# Patient Record
Sex: FEMALE | Race: WHITE | NOT HISPANIC OR LATINO | Employment: UNEMPLOYED | RURAL
[De-identification: names, ages, dates, MRNs, and addresses within clinical notes are randomized per-mention and may not be internally consistent; named-entity substitution may affect disease eponyms.]

---

## 2024-08-14 ENCOUNTER — OFFICE VISIT (OUTPATIENT)
Dept: FAMILY MEDICINE CLINIC | Facility: CLINIC | Age: 49
End: 2024-08-14
Payer: COMMERCIAL

## 2024-08-14 VITALS
WEIGHT: 161.4 LBS | OXYGEN SATURATION: 98 % | SYSTOLIC BLOOD PRESSURE: 126 MMHG | HEART RATE: 69 BPM | DIASTOLIC BLOOD PRESSURE: 80 MMHG | BODY MASS INDEX: 24.46 KG/M2 | HEIGHT: 68 IN

## 2024-08-14 DIAGNOSIS — Z13.29 SCREENING FOR THYROID DISORDER: ICD-10-CM

## 2024-08-14 DIAGNOSIS — Z13.1 SCREENING FOR DIABETES MELLITUS (DM): ICD-10-CM

## 2024-08-14 DIAGNOSIS — Q61.02 MULTIPLE CONGENITAL CYSTS OF KIDNEY: ICD-10-CM

## 2024-08-14 DIAGNOSIS — Z13.21 ENCOUNTER FOR VITAMIN DEFICIENCY SCREENING: ICD-10-CM

## 2024-08-14 DIAGNOSIS — M85.80 OSTEOPENIA, UNSPECIFIED LOCATION: ICD-10-CM

## 2024-08-14 DIAGNOSIS — Z13.220 SCREENING FOR LIPID DISORDERS: ICD-10-CM

## 2024-08-14 DIAGNOSIS — Z12.31 SCREENING MAMMOGRAM FOR BREAST CANCER: ICD-10-CM

## 2024-08-14 DIAGNOSIS — E61.1 IRON DEFICIENCY: ICD-10-CM

## 2024-08-14 DIAGNOSIS — K51.90 ULCERATIVE COLITIS WITHOUT COMPLICATIONS, UNSPECIFIED LOCATION: ICD-10-CM

## 2024-08-14 DIAGNOSIS — Z00.00 ANNUAL PHYSICAL EXAM: Primary | ICD-10-CM

## 2024-08-14 PROBLEM — K50.10 CROHN'S DISEASE OF LARGE BOWEL: Status: RESOLVED | Noted: 2024-08-14 | Resolved: 2024-08-14

## 2024-08-14 PROBLEM — K50.10 CROHN'S DISEASE OF LARGE BOWEL: Status: ACTIVE | Noted: 2024-08-14

## 2024-08-14 PROBLEM — F41.9 ANXIETY: Status: ACTIVE | Noted: 2017-04-10

## 2024-08-14 PROCEDURE — 1159F MED LIST DOCD IN RCRD: CPT | Performed by: NURSE PRACTITIONER

## 2024-08-14 PROCEDURE — 1160F RVW MEDS BY RX/DR IN RCRD: CPT | Performed by: NURSE PRACTITIONER

## 2024-08-14 PROCEDURE — 2014F MENTAL STATUS ASSESS: CPT | Performed by: NURSE PRACTITIONER

## 2024-08-14 PROCEDURE — 99386 PREV VISIT NEW AGE 40-64: CPT | Performed by: NURSE PRACTITIONER

## 2024-08-14 RX ORDER — LISINOPRIL 5 MG/1
TABLET ORAL
COMMUNITY
Start: 2024-06-05

## 2024-08-14 RX ORDER — UPADACITINIB 30 MG/1
1 TABLET, EXTENDED RELEASE ORAL DAILY
COMMUNITY

## 2024-08-14 RX ORDER — ACETAMINOPHEN 160 MG
1 TABLET,DISINTEGRATING ORAL DAILY
COMMUNITY
Start: 2024-07-18

## 2024-08-14 RX ORDER — VEDOLIZUMAB 300 MG/5ML
INJECTION, POWDER, LYOPHILIZED, FOR SOLUTION INTRAVENOUS
COMMUNITY

## 2024-08-14 RX ORDER — MESALAMINE 0.38 G/1
CAPSULE, EXTENDED RELEASE ORAL
COMMUNITY
Start: 2024-08-08

## 2024-08-14 RX ORDER — ALPRAZOLAM 1 MG/1
TABLET ORAL
COMMUNITY
Start: 2024-05-08

## 2024-08-14 NOTE — PROGRESS NOTES
Annual Physical     Name: Jaida Cruz    : 1975     MRN: [id number]     Chief Complaint  Annual Exam (Pt is here to establish care and for a physical )    Subjective     History of Present Illness:  Jaida Cruz is a 48 y.o. female who presents today for a health maintenance evaluation.    She is overall doing well, transferring care here from a PCP in [facility].     She has history of polycystic kidney and has a nephrologist in [place], [place]. She has eGFR that is stable in the 40's and stable, but elevated, creatinine. She sees specialist every 3-6 months. She takes Lisinopril for kidney protection - she does not have history of HTN. Cannot tolerate higher dose than 5mg.     She has history of ulcerative colitis and has a GI specialist in [place], [place]. She has been on Renvoq and Entyvio infusions, both are managed by her specialist. Last colonoscopy was in Summer 2023.       Social History  Tobacco Use: Medium Risk (2024)    Patient History     Smoking Tobacco Use: Former     Smokeless Tobacco Use: Never     Passive Exposure: Not on file     Social History     Socioeconomic History    Marital status:    Tobacco Use    Smoking status: Former     Current packs/day: 0.00     Average packs/day: 1 pack/day for 13.0 years (13.0 ttl pk-yrs)     Types: Cigarettes     Start date:      Quit date:      Years since quittin.6    Smokeless tobacco: Never   Vaping Use    Vaping status: Never Used   Substance and Sexual Activity    Alcohol use: Yes     Alcohol/week: 3.0 standard drinks of alcohol     Types: 2 Glasses of wine, 1 Cans of beer per week    Drug use: Never    Sexual activity: Yes     Partners: Male     Birth control/protection: Tubal ligation       General History  Jaida  does have regular dental visits. Has an upcoming appointment.   She does not complain of vision problems. Last eye exam was a few years ago, has an upcoming appt.  Immunizations are  "up to date. The patient needs the following immunizations: none    Lifestyle  Jaida  consumes in general, a \"healthy\" diet  .  She exercises intermittently.    Reproductive Health  Jaida  is postmenopausal.  She reports periods are nonexistent, post-menopausal.  She is sexually active. Her contraceptive plan is no method.    Screening  Last pap was years ago - will schedule this soon  Last Completed Pap Smear       This patient has no relevant Health Maintenance data.        . History of abnormal pap smear or family history of gyn cancer: none    Last mammogram was about 3 years ago, all have normal. Agreeable to schedule this.   Last Completed Mammogram       This patient has no relevant Health Maintenance data.        . Personal or family history of abnormal mammograms or breast cancer: none    Last colonoscopy was November 2023 - sees GI regularly for these due to UC  Last Completed Colonoscopy            COLORECTAL CANCER SCREENING (COLONOSCOPY - Every 10 Years) Next due on 11/21/2033 11/21/2023  Outside Procedure: COLONOSCOPY    01/01/2015  Outside Procedure: COLONOSCOPY    01/01/2014  Outside Procedure: COLONOSCOPY    01/01/2013  Outside Procedure: COLONOSCOPY    06/01/2012  Outside Procedure: COLONOSCOPY    Only the first 5 history entries have been loaded, but more history exists.                . Family history of colon cancer: none    Last DEXA was around 3 years ago, was noted to have osteopenia. Reports she has a had time taking calcium due to her kidneys. Is agreeable to schedule this.     Health Maintenance Summary            Ordered - MAMMOGRAM (Every 2 Years) Ordered on 8/14/2024      No completion, postpone, or frequency change history exists for this topic.              Ordered - DXA SCAN (Every 2 Years) Ordered on 8/14/2024      No completion, postpone, or frequency change history exists for this topic.              Ordered - LIPID PANEL (Yearly) Ordered on 8/14/2024      11/10/2021 "  LIPID PANEL    06/18/2021  LIPID PANEL              Overdue - HEPATITIS C SCREENING (Once) Never done      No completion, postpone, or frequency change history exists for this topic.              Overdue - PAP SMEAR (Every 3 Years) Never done      No completion, postpone, or frequency change history exists for this topic.              INFLUENZA VACCINE (Yearly - August to March) Due since 8/1/2024      10/15/2021  Imm Admin: 31-influenza Vac Quardvalent Preservativ    10/26/2020  Imm Admin: Influenza, Unspecified    10/12/2020  Imm Admin: Fluzone (or Fluarix & Flulaval for VFC) >6mos    10/22/2019  Imm Admin: Influenza, Unspecified    11/26/2018  Imm Admin: Fluzone (or Fluarix & Flulaval for VFC) >6mos    Only the first 5 history entries have been loaded, but more history exists.              Postponed - COVID-19 Vaccine (5 - 2023-24 season) Postponed until 11/3/2024      08/14/2024  Postponed until 11/3/2024 by Luciana Ren MA (Product Unavailable)    11/20/2021  Imm Admin: COVID-19 (MODERNA) 1st,2nd,3rd Dose Monovalent    09/03/2021  Imm Admin: COVID-19 (PFIZER) Purple Cap Monovalent    08/19/2021  Imm Admin: COVID-19 (MODERNA) 1st,2nd,3rd Dose Monovalent    08/13/2021  Imm Admin: COVID-19 (PFIZER) Purple Cap Monovalent    Only the first 5 history entries have been loaded, but more history exists.              ANNUAL PHYSICAL (Yearly) Next due on 8/14/2025 08/14/2024  Done              TDAP/TD VACCINES (3 - Td or Tdap) Next due on 10/1/2032      10/01/2022  Imm Admin: Tdap    09/01/2013  Imm Admin: Tdap              COLORECTAL CANCER SCREENING (COLONOSCOPY - Every 10 Years) Next due on 11/21/2033 11/21/2023  Outside Procedure: COLONOSCOPY    01/01/2015  Outside Procedure: COLONOSCOPY    01/01/2014  Outside Procedure: COLONOSCOPY    01/01/2013  Outside Procedure: COLONOSCOPY    06/01/2012  Outside Procedure: COLONOSCOPY    Only the first 5 history entries have been loaded, but more history exists.  "             Pneumococcal Vaccine 0-64 (Series Information) Aged Out      03/11/2020  Imm Admin: Pneumococcal Polysaccharide (PPSV23)    04/24/2015  Imm Admin: Pneumococcal Conjugate 13-Valent (PCV13)    01/01/2013  Imm Admin: Pneumococcal, Unspecified    01/01/2013  Imm Admin: Pneumococcal Polysaccharide (PPSV23)                  Immunization History   Administered Date(s) Administered    31-influenza Vac Quardvalent Preservativ 10/15/2021    COVID-19 (MODERNA) 1st,2nd,3rd Dose Monovalent 08/19/2021, 11/20/2021    COVID-19 (PFIZER) Purple Cap Monovalent 08/13/2021, 09/03/2021    DTaP 03/01/2014    Fluzone  >6mos 10/16/2014    Fluzone (or Fluarix & Flulaval for VFC) >6mos 11/26/2018, 10/12/2020    Hepatitis B Adult/Adolescent IM 01/01/2008    Influenza Seasonal Injectable 10/01/2013, 10/01/2015    Influenza, Unspecified 10/22/2019, 10/26/2020    Pneumococcal Conjugate 13-Valent (PCV13) 04/24/2015    Pneumococcal Polysaccharide (PPSV23) 01/01/2013, 03/11/2020    Pneumococcal, Unspecified 01/01/2013    Tdap 09/01/2013, 10/01/2022         Objective     Vital Signs  /80 (BP Location: Left arm, Patient Position: Sitting, Cuff Size: Adult)   Pulse 69   Ht 172.7 cm (68\")   Wt 73.2 kg (161 lb 6.4 oz)   SpO2 98%   BMI 24.54 kg/m²   Estimated body mass index is 24.54 kg/m² as calculated from the following:    Height as of this encounter: 172.7 cm (68\").    Weight as of this encounter: 73.2 kg (161 lb 6.4 oz).    BMI is within normal parameters. No other follow-up for BMI required.    Physical Exam  Vitals reviewed.   Constitutional:       Appearance: Normal appearance.   Cardiovascular:      Rate and Rhythm: Normal rate and regular rhythm.      Heart sounds: Normal heart sounds. No murmur heard.  Pulmonary:      Effort: No respiratory distress.      Breath sounds: Normal breath sounds. No stridor. No wheezing or rhonchi.   Skin:     General: Skin is warm and dry.      Capillary Refill: Capillary refill takes less " than 2 seconds.   Neurological:      General: No focal deficit present.      Mental Status: She is alert and oriented to person, place, and time.   Psychiatric:         Mood and Affect: Mood normal.         Behavior: Behavior normal.          Assessment and Plan     Diagnoses and all orders for this visit:    1. Annual physical exam (Primary)  -     Mammo Screening Bilateral With CAD; Future  -     DEXA Bone Density Axial; Future  -     CBC & Differential  -     Comprehensive Metabolic Panel  -     Hemoglobin A1c  -     Lipid Panel  -     TSH  -     Vitamin B12  -     Vitamin D,25-Hydroxy  -     Folate  -     T4, free  -     Iron Profile  -     Ferritin    2. Ulcerative colitis without complications, unspecified location    3. Multiple congenital cysts of kidney    4. Iron deficiency  -     CBC & Differential  -     Iron Profile  -     Ferritin    5. Osteopenia, unspecified location  -     DEXA Bone Density Axial; Future  -     Comprehensive Metabolic Panel  -     Vitamin D,25-Hydroxy    6. Screening mammogram for breast cancer  -     Mammo Screening Bilateral With CAD; Future    7. Screening for diabetes mellitus (DM)  -     Comprehensive Metabolic Panel  -     Hemoglobin A1c    8. Screening for lipid disorders  -     Lipid Panel    9. Screening for thyroid disorder  -     TSH  -     T4, free    10. Encounter for vitamin deficiency screening  -     Vitamin B12  -     Folate      Past medical and surgical history, as well as allergies, family history and social history, were reviewed and discussed with the patient.  Chronic conditions were reviewed, as well as current medications.   Anticipatory guidance handouts including healthy diet, health maintenance, as well as regular exercise and general instructions were given via [employer]. Patient was given the opportunity to ask questions and discuss any concerns.      Blood work ordered today. Will contact with results when available. Will prescribe/adjust medications  based on any abnormalities seen.     Mammogram and DEXA scan ordered, will follow for results.       Follow Up  Return in about 1 year (around 8/14/2025) for Annual physical.    Lisseth Pal, APRN

## 2024-08-15 LAB
25(OH)D3+25(OH)D2 SERPL-MCNC: 41.5 NG/ML (ref 30–100)
ALBUMIN SERPL-MCNC: 4.5 G/DL (ref 3.9–4.9)
ALP SERPL-CCNC: 93 IU/L (ref 44–121)
ALT SERPL-CCNC: 18 IU/L (ref 0–32)
AST SERPL-CCNC: 19 IU/L (ref 0–40)
BASOPHILS # BLD AUTO: 0 X10E3/UL (ref 0–0.2)
BASOPHILS NFR BLD AUTO: 0 %
BILIRUB SERPL-MCNC: 0.2 MG/DL (ref 0–1.2)
BUN SERPL-MCNC: 23 MG/DL (ref 6–24)
BUN/CREAT SERPL: 18 (ref 9–23)
CALCIUM SERPL-MCNC: 9.7 MG/DL (ref 8.7–10.2)
CHLORIDE SERPL-SCNC: 105 MMOL/L (ref 96–106)
CHOLEST SERPL-MCNC: 267 MG/DL (ref 100–199)
CO2 SERPL-SCNC: 18 MMOL/L (ref 20–29)
CREAT SERPL-MCNC: 1.28 MG/DL (ref 0.57–1)
EGFRCR SERPLBLD CKD-EPI 2021: 51 ML/MIN/1.73
EOSINOPHIL # BLD AUTO: 0.1 X10E3/UL (ref 0–0.4)
EOSINOPHIL NFR BLD AUTO: 1 %
ERYTHROCYTE [DISTWIDTH] IN BLOOD BY AUTOMATED COUNT: 13.4 % (ref 11.7–15.4)
FERRITIN SERPL-MCNC: 154 NG/ML (ref 15–150)
FOLATE SERPL-MCNC: 7 NG/ML
GLOBULIN SER CALC-MCNC: 3.1 G/DL (ref 1.5–4.5)
GLUCOSE SERPL-MCNC: 88 MG/DL (ref 70–99)
HBA1C MFR BLD: 5.3 % (ref 4.8–5.6)
HCT VFR BLD AUTO: 38.3 % (ref 34–46.6)
HDLC SERPL-MCNC: 85 MG/DL
HGB BLD-MCNC: 12.2 G/DL (ref 11.1–15.9)
IMM GRANULOCYTES # BLD AUTO: 0 X10E3/UL (ref 0–0.1)
IMM GRANULOCYTES NFR BLD AUTO: 0 %
IRON SATN MFR SERPL: 27 % (ref 15–55)
IRON SERPL-MCNC: 92 UG/DL (ref 27–159)
LDLC SERPL CALC-MCNC: 151 MG/DL (ref 0–99)
LYMPHOCYTES # BLD AUTO: 1 X10E3/UL (ref 0.7–3.1)
LYMPHOCYTES NFR BLD AUTO: 12 %
MCH RBC QN AUTO: 28.4 PG (ref 26.6–33)
MCHC RBC AUTO-ENTMCNC: 31.9 G/DL (ref 31.5–35.7)
MCV RBC AUTO: 89 FL (ref 79–97)
MONOCYTES # BLD AUTO: 0.6 X10E3/UL (ref 0.1–0.9)
MONOCYTES NFR BLD AUTO: 7 %
NEUTROPHILS # BLD AUTO: 6.1 X10E3/UL (ref 1.4–7)
NEUTROPHILS NFR BLD AUTO: 80 %
PLATELET # BLD AUTO: 341 X10E3/UL (ref 150–450)
POTASSIUM SERPL-SCNC: 4.4 MMOL/L (ref 3.5–5.2)
PROT SERPL-MCNC: 7.6 G/DL (ref 6–8.5)
RBC # BLD AUTO: 4.3 X10E6/UL (ref 3.77–5.28)
SODIUM SERPL-SCNC: 138 MMOL/L (ref 134–144)
T4 FREE SERPL-MCNC: 1.32 NG/DL (ref 0.82–1.77)
TIBC SERPL-MCNC: 338 UG/DL (ref 250–450)
TRIGL SERPL-MCNC: 178 MG/DL (ref 0–149)
TSH SERPL DL<=0.005 MIU/L-ACNC: 1.59 UIU/ML (ref 0.45–4.5)
UIBC SERPL-MCNC: 246 UG/DL (ref 131–425)
VIT B12 SERPL-MCNC: 893 PG/ML (ref 232–1245)
VLDLC SERPL CALC-MCNC: 31 MG/DL (ref 5–40)
WBC # BLD AUTO: 7.8 X10E3/UL (ref 3.4–10.8)

## 2024-08-21 ENCOUNTER — TELEPHONE (OUTPATIENT)
Dept: FAMILY MEDICINE CLINIC | Facility: CLINIC | Age: 49
End: 2024-08-21
Payer: COMMERCIAL

## 2024-08-21 NOTE — TELEPHONE ENCOUNTER
"Relay     \"Patient is scheduled October 16 at 830am for her mammogram and bone density scan. She will need to arrive 15 mins early.  [facility]   [address] [place] [address]  #[phone] \"                "

## 2024-08-21 NOTE — TELEPHONE ENCOUNTER
Name: Jaida Cruz    Relationship: Self    Best Callback Number: [phone]    HUB PROVIDED THE RELAY MESSAGE FROM THE OFFICE   PATIENT VOICED UNDERSTANDING AND HAS NO FURTHER QUESTIONS AT THIS TIME    ADDITIONAL INFORMATION:

## 2024-08-26 ENCOUNTER — OFFICE VISIT (OUTPATIENT)
Dept: FAMILY MEDICINE CLINIC | Facility: CLINIC | Age: 49
End: 2024-08-26
Payer: COMMERCIAL

## 2024-08-26 VITALS
TEMPERATURE: 98 F | OXYGEN SATURATION: 98 % | WEIGHT: 160 LBS | RESPIRATION RATE: 20 BRPM | HEIGHT: 68 IN | HEART RATE: 60 BPM | BODY MASS INDEX: 24.25 KG/M2 | DIASTOLIC BLOOD PRESSURE: 60 MMHG | SYSTOLIC BLOOD PRESSURE: 112 MMHG

## 2024-08-26 DIAGNOSIS — J40 BRONCHITIS: Primary | ICD-10-CM

## 2024-08-26 DIAGNOSIS — R09.81 NASAL CONGESTION: ICD-10-CM

## 2024-08-26 DIAGNOSIS — R05.1 ACUTE COUGH: ICD-10-CM

## 2024-08-26 DIAGNOSIS — R06.02 SHORTNESS OF BREATH: ICD-10-CM

## 2024-08-26 LAB
EXPIRATION DATE: NORMAL
FLUAV AG UPPER RESP QL IA.RAPID: NOT DETECTED
FLUBV AG UPPER RESP QL IA.RAPID: NOT DETECTED
INTERNAL CONTROL: NORMAL
Lab: NORMAL
SARS-COV-2 AG UPPER RESP QL IA.RAPID: NOT DETECTED

## 2024-08-26 PROCEDURE — 99214 OFFICE O/P EST MOD 30 MIN: CPT | Performed by: NURSE PRACTITIONER

## 2024-08-26 PROCEDURE — 1159F MED LIST DOCD IN RCRD: CPT | Performed by: NURSE PRACTITIONER

## 2024-08-26 PROCEDURE — 1160F RVW MEDS BY RX/DR IN RCRD: CPT | Performed by: NURSE PRACTITIONER

## 2024-08-26 PROCEDURE — 1126F AMNT PAIN NOTED NONE PRSNT: CPT | Performed by: NURSE PRACTITIONER

## 2024-08-26 PROCEDURE — 87428 SARSCOV & INF VIR A&B AG IA: CPT | Performed by: NURSE PRACTITIONER

## 2024-08-26 RX ORDER — ALBUTEROL SULFATE 90 UG/1
2 AEROSOL, METERED RESPIRATORY (INHALATION) EVERY 4 HOURS PRN
Qty: 18 G | Refills: 1 | Status: SHIPPED | OUTPATIENT
Start: 2024-08-26

## 2024-08-26 RX ORDER — METHYLPREDNISOLONE 4 MG
TABLET, DOSE PACK ORAL
Qty: 21 TABLET | Refills: 0 | Status: SHIPPED | OUTPATIENT
Start: 2024-08-26

## 2024-08-26 NOTE — PROGRESS NOTES
"    Office Note     Name: Jaida Cruz    : 1975     MRN: [id number]     Chief Complaint  Cough (Covid positive 5x weeks ago. ), Nasal Congestion, and Fatigue    Subjective     History of Present Illness:  Jaida Cruz is a 48 y.o. female who presents today with complaints of cough, nasal congestion, fatigue, alternating sweats and chills for the past 4 days. Reports her chest feels \"tight\", denies wheezing.     She was COVID+ about 5 weeks ago. Reports her biggest worry currently is her cough - reports the cough seems to be getting deeper and deeper into her chest. She reports feeling short of breath with activity over the last few days - has stayed inside and laid around because of this.       Objective     Past Medical History:   Diagnosis Date    Anxiety     Hyperlipidemia     Inflammatory bowel disease     Osteopenia     Renal insufficiency      Past Surgical History:   Procedure Laterality Date    ADENOIDECTOMY       SECTION      COLONOSCOPY      TUBAL ABDOMINAL LIGATION       Family History   Problem Relation Age of Onset    Anxiety disorder Mother     Arthritis Mother     Hearing loss Mother     Asthma Father     Cancer Father         Bladder    COPD Father     Diabetes Father     Heart disease Father     Hyperlipidemia Father        Vital Signs  /60   Pulse 60   Temp 98 °F (36.7 °C)   Resp 20   Ht 172.7 cm (67.99\")   Wt 72.6 kg (160 lb)   SpO2 98%   BMI 24.33 kg/m²   Estimated body mass index is 24.33 kg/m² as calculated from the following:    Height as of this encounter: 172.7 cm (67.99\").    Weight as of this encounter: 72.6 kg (160 lb).    Physical Exam  Vitals reviewed.   Constitutional:       Appearance: Normal appearance.   Cardiovascular:      Rate and Rhythm: Normal rate and regular rhythm.      Heart sounds: Normal heart sounds.   Pulmonary:      Effort: Pulmonary effort is normal.      Breath sounds: Examination of the left-upper field reveals " decreased breath sounds. Examination of the left-middle field reveals decreased breath sounds. Examination of the left-lower field reveals decreased breath sounds. Decreased breath sounds (mildly) present.   Skin:     General: Skin is warm and dry.      Capillary Refill: Capillary refill takes less than 2 seconds.   Neurological:      General: No focal deficit present.      Mental Status: She is alert and oriented to person, place, and time.   Psychiatric:         Mood and Affect: Mood normal.         Behavior: Behavior normal.          POCT Results (if applicable):  Results for orders placed or performed in visit on 08/26/24   POCT SARS-CoV-2 Antigen ALLY + Flu    Specimen: Swab   Result Value Ref Range    SARS Antigen Not Detected Not Detected, Presumptive Negative    Influenza A Antigen ALLY Not Detected Not Detected    Influenza B Antigen ALLY Not Detected Not Detected    Internal Control Passed Passed    Lot Number [id number]     Expiration Date 05 25 2025             Assessment and Plan     Diagnoses and all orders for this visit:    1. Bronchitis (Primary)    2. Acute cough  -     POCT SARS-CoV-2 Antigen ALLY + Flu  -     XR Chest PA & Lateral (In Office)  -     methylPREDNISolone (MEDROL) 4 MG dose pack; Take as directed on package instructions.  Dispense: 21 tablet; Refill: 0    3. Nasal congestion    4. Shortness of breath  -     albuterol sulfate  (90 Base) MCG/ACT inhaler; Inhale 2 puffs Every 4 (Four) Hours As Needed for Shortness of Air.  Dispense: 18 g; Refill: 1      Imaging ordered today. Will contact with results when available. Will adjust plan of care and/or make appropriate referrals based on any abnormalities seen.     We discussed treatment options for bronchitis, risks/benefits, and possible side effects associated.  Patient verbalized understanding and is agreeable to treatment plan.  Will return if symptoms worsen and/or persist.      Follow Up  Return if symptoms worsen or fail to  improve.    Lisseth Pal, APRN

## 2024-09-10 ENCOUNTER — OFFICE VISIT (OUTPATIENT)
Dept: FAMILY MEDICINE CLINIC | Facility: CLINIC | Age: 49
End: 2024-09-10
Payer: COMMERCIAL

## 2024-09-10 VITALS
WEIGHT: 157 LBS | HEART RATE: 70 BPM | HEIGHT: 68 IN | SYSTOLIC BLOOD PRESSURE: 116 MMHG | BODY MASS INDEX: 23.79 KG/M2 | DIASTOLIC BLOOD PRESSURE: 74 MMHG | OXYGEN SATURATION: 97 %

## 2024-09-10 DIAGNOSIS — R53.82 CHRONIC FATIGUE: ICD-10-CM

## 2024-09-10 DIAGNOSIS — R05.3 CHRONIC COUGH: Primary | ICD-10-CM

## 2024-09-10 PROCEDURE — 1159F MED LIST DOCD IN RCRD: CPT | Performed by: NURSE PRACTITIONER

## 2024-09-10 PROCEDURE — 1126F AMNT PAIN NOTED NONE PRSNT: CPT | Performed by: NURSE PRACTITIONER

## 2024-09-10 PROCEDURE — 99213 OFFICE O/P EST LOW 20 MIN: CPT | Performed by: NURSE PRACTITIONER

## 2024-09-10 PROCEDURE — 1160F RVW MEDS BY RX/DR IN RCRD: CPT | Performed by: NURSE PRACTITIONER

## 2024-09-10 RX ORDER — SODIUM CHLORIDE 9 MG/ML
INJECTION, SOLUTION INTRAVENOUS
COMMUNITY
Start: 2024-08-29

## 2024-09-10 NOTE — PROGRESS NOTES
"    Office Note     Name: Jaida Cruz    : 1975     MRN: [id number]     Chief Complaint  Fatigue (Pt states she is still feeling very fatigued since having covid from 6 weeks ago ) and Cough (Pt states she has been having a cough )    Subjective     History of Present Illness:  Jaida Cruz is a 48 y.o. female who presents today with complaints of continued fatigue and cough. She has recently had a telemedicine appointment with her IBD specialist in [place] who recommended that we look into possible Histoplasmosis diagnosis, despite normal recent lung x-ray.     [place]    She reports a generalized feeling of something \"just not being right\". She has completed a Medrol dose pack. She has an albuterol inhaler - reports this was effective initially, no longer seems to be helping her cough so she has not been using it recently.         Objective     Past Medical History:   Diagnosis Date    Anxiety     Hyperlipidemia     Inflammatory bowel disease     Osteopenia     Renal insufficiency      Past Surgical History:   Procedure Laterality Date    ADENOIDECTOMY       SECTION      COLONOSCOPY      TUBAL ABDOMINAL LIGATION       Family History   Problem Relation Age of Onset    Anxiety disorder Mother     Arthritis Mother     Hearing loss Mother     Asthma Father     Cancer Father         Bladder    COPD Father     Diabetes Father     Heart disease Father     Hyperlipidemia Father        Vital Signs  /74 (BP Location: Left arm, Patient Position: Sitting)   Pulse 70   Ht 172.7 cm (67.99\")   Wt 71.2 kg (157 lb)   SpO2 97%   BMI 23.88 kg/m²   Estimated body mass index is 23.88 kg/m² as calculated from the following:    Height as of this encounter: 172.7 cm (67.99\").    Weight as of this encounter: 71.2 kg (157 lb).    Physical Exam  Vitals reviewed.   Constitutional:       Appearance: Normal appearance.   Cardiovascular:      Rate and Rhythm: Normal rate " and regular rhythm.      Heart sounds: Normal heart sounds.   Pulmonary:      Effort: Pulmonary effort is normal. No respiratory distress.      Breath sounds: Normal breath sounds. No stridor. No wheezing or rhonchi.   Skin:     General: Skin is warm and dry.      Capillary Refill: Capillary refill takes less than 2 seconds.   Neurological:      General: No focal deficit present.      Mental Status: She is alert and oriented to person, place, and time.   Psychiatric:         Mood and Affect: Mood normal.         Behavior: Behavior normal.          Assessment and Plan     Diagnoses and all orders for this visit:    1. Chronic cough (Primary)  -     Histoplasma capsulatum Antibodies  -     CBC & Differential    2. Chronic fatigue  -     Histoplasma capsulatum Antibodies  -     CBC & Differential      Blood work ordered today. Will contact with results when available. Will prescribe/adjust medications based on any abnormalities seen.       Follow Up  Return if symptoms worsen or fail to improve.    DAYSI Izaguirre

## 2024-09-14 LAB
BASOPHILS # BLD AUTO: 0 X10E3/UL (ref 0–0.2)
BASOPHILS NFR BLD AUTO: 0 %
EOSINOPHIL # BLD AUTO: 0.1 X10E3/UL (ref 0–0.4)
EOSINOPHIL NFR BLD AUTO: 2 %
ERYTHROCYTE [DISTWIDTH] IN BLOOD BY AUTOMATED COUNT: 13.4 % (ref 11.7–15.4)
H CAPSUL MYC AB TITR SER CF: NEGATIVE {TITER}
H CAPSUL YST AB TITR SER CF: NEGATIVE {TITER}
HCT VFR BLD AUTO: 38.1 % (ref 34–46.6)
HGB BLD-MCNC: 12.4 G/DL (ref 11.1–15.9)
IMM GRANULOCYTES # BLD AUTO: 0 X10E3/UL (ref 0–0.1)
IMM GRANULOCYTES NFR BLD AUTO: 0 %
LYMPHOCYTES # BLD AUTO: 0.7 X10E3/UL (ref 0.7–3.1)
LYMPHOCYTES NFR BLD AUTO: 17 %
MCH RBC QN AUTO: 28.4 PG (ref 26.6–33)
MCHC RBC AUTO-ENTMCNC: 32.5 G/DL (ref 31.5–35.7)
MCV RBC AUTO: 87 FL (ref 79–97)
MONOCYTES # BLD AUTO: 0.4 X10E3/UL (ref 0.1–0.9)
MONOCYTES NFR BLD AUTO: 9 %
NEUTROPHILS # BLD AUTO: 3.2 X10E3/UL (ref 1.4–7)
NEUTROPHILS NFR BLD AUTO: 72 %
PLATELET # BLD AUTO: 189 X10E3/UL (ref 150–450)
RBC # BLD AUTO: 4.36 X10E6/UL (ref 3.77–5.28)
WBC # BLD AUTO: 4.5 X10E3/UL (ref 3.4–10.8)

## 2024-09-15 ENCOUNTER — PATIENT MESSAGE (OUTPATIENT)
Dept: FAMILY MEDICINE CLINIC | Facility: CLINIC | Age: 49
End: 2024-09-15
Payer: COMMERCIAL

## 2024-09-19 ENCOUNTER — OFFICE VISIT (OUTPATIENT)
Dept: FAMILY MEDICINE CLINIC | Facility: CLINIC | Age: 49
End: 2024-09-19
Payer: COMMERCIAL

## 2024-09-19 VITALS
WEIGHT: 158 LBS | DIASTOLIC BLOOD PRESSURE: 78 MMHG | HEIGHT: 68 IN | BODY MASS INDEX: 23.95 KG/M2 | OXYGEN SATURATION: 97 % | SYSTOLIC BLOOD PRESSURE: 112 MMHG | HEART RATE: 70 BPM

## 2024-09-19 DIAGNOSIS — Z01.419 ENCOUNTER FOR CERVICAL PAP SMEAR WITH PELVIC EXAM: ICD-10-CM

## 2024-09-19 DIAGNOSIS — Q61.02 MULTIPLE CONGENITAL CYSTS OF KIDNEY: Primary | ICD-10-CM

## 2024-09-19 DIAGNOSIS — R05.3 CHRONIC COUGH: ICD-10-CM

## 2024-09-19 PROCEDURE — 1159F MED LIST DOCD IN RCRD: CPT | Performed by: NURSE PRACTITIONER

## 2024-09-19 PROCEDURE — 99214 OFFICE O/P EST MOD 30 MIN: CPT | Performed by: NURSE PRACTITIONER

## 2024-09-19 PROCEDURE — 1126F AMNT PAIN NOTED NONE PRSNT: CPT | Performed by: NURSE PRACTITIONER

## 2024-09-19 PROCEDURE — 1160F RVW MEDS BY RX/DR IN RCRD: CPT | Performed by: NURSE PRACTITIONER

## 2024-09-19 RX ORDER — LISINOPRIL 5 MG/1
5 TABLET ORAL DAILY
Qty: 90 TABLET | Refills: 1 | Status: SHIPPED | OUTPATIENT
Start: 2024-09-19

## 2024-09-19 RX ORDER — MONTELUKAST SODIUM 10 MG/1
10 TABLET ORAL NIGHTLY
Qty: 30 TABLET | Refills: 2 | Status: SHIPPED | OUTPATIENT
Start: 2024-09-19

## 2024-09-19 RX ORDER — AZELASTINE 1 MG/ML
2 SPRAY, METERED NASAL 2 TIMES DAILY
Qty: 30 ML | Refills: 1 | Status: SHIPPED | OUTPATIENT
Start: 2024-09-19

## 2024-09-19 RX ORDER — CETIRIZINE HYDROCHLORIDE 10 MG/1
10 TABLET ORAL
COMMUNITY

## 2024-09-24 LAB
CONV .: NORMAL
CYTOLOGIST CVX/VAG CYTO: NORMAL
CYTOLOGY CVX/VAG DOC CYTO: NORMAL
CYTOLOGY CVX/VAG DOC THIN PREP: NORMAL
DX ICD CODE: NORMAL
Lab: NORMAL
OTHER STN SPEC: NORMAL
STAT OF ADQ CVX/VAG CYTO-IMP: NORMAL

## 2024-10-23 ENCOUNTER — OFFICE VISIT (OUTPATIENT)
Dept: FAMILY MEDICINE CLINIC | Facility: CLINIC | Age: 49
End: 2024-10-23
Payer: COMMERCIAL

## 2024-10-23 VITALS
SYSTOLIC BLOOD PRESSURE: 106 MMHG | BODY MASS INDEX: 24.86 KG/M2 | WEIGHT: 164 LBS | OXYGEN SATURATION: 100 % | DIASTOLIC BLOOD PRESSURE: 78 MMHG | HEART RATE: 71 BPM | HEIGHT: 68 IN

## 2024-10-23 DIAGNOSIS — G89.29 CHRONIC LEFT HIP PAIN: Primary | ICD-10-CM

## 2024-10-23 DIAGNOSIS — M25.552 CHRONIC LEFT HIP PAIN: Primary | ICD-10-CM

## 2024-10-23 PROCEDURE — 99213 OFFICE O/P EST LOW 20 MIN: CPT | Performed by: NURSE PRACTITIONER

## 2024-10-23 PROCEDURE — 1160F RVW MEDS BY RX/DR IN RCRD: CPT | Performed by: NURSE PRACTITIONER

## 2024-10-23 PROCEDURE — 1159F MED LIST DOCD IN RCRD: CPT | Performed by: NURSE PRACTITIONER

## 2024-10-23 PROCEDURE — 1125F AMNT PAIN NOTED PAIN PRSNT: CPT | Performed by: NURSE PRACTITIONER

## 2024-10-23 NOTE — PROGRESS NOTES
"    Office Note     Name: Jaida Cruz    : 1975     MRN: [id number]     Chief Complaint  Hip Pain (Pt states she has had left hip pain for years now. Pt states it has gotten worse and hears her hip popping. )    Subjective     History of Present Illness:  Jaida Cruz is a 48 y.o. female who presents today for chronic left hip pain.     History of Present Illness  The patient presents for evaluation of left hip pain.    She reports experiencing left hip pain since 2019. At that time, she sought treatment from [facility] due to concerns about necrosis, given her history of ulcerative colitis and frequent steroid use. An MRI was performed, revealing no intra-articular pathology but a slight signal change at the greater trochanter. She received a hip injection under fluoroscopy, which provided relief for a period of time.    Recently, she has noticed a recurrence of the pain, accompanied by a popping sensation in her hip. She also reports a feeling of heaviness and a dragging sensation in her leg, similar to her previous symptoms. She is considering seeking orthopedic consultation or another injection for relief.      Objective     Past Medical History:   Diagnosis Date    Anxiety     Hyperlipidemia     Inflammatory bowel disease     Osteopenia     Renal insufficiency      Past Surgical History:   Procedure Laterality Date    ADENOIDECTOMY       SECTION      COLONOSCOPY      TUBAL ABDOMINAL LIGATION       Family History   Problem Relation Age of Onset    Anxiety disorder Mother     Arthritis Mother     Hearing loss Mother     Asthma Father     Cancer Father         Bladder    COPD Father     Diabetes Father     Heart disease Father     Hyperlipidemia Father        Vital Signs  /78 (BP Location: Left arm, Patient Position: Sitting)   Pulse 71   Ht 172.7 cm (67.99\")   Wt 74.4 kg (164 lb)   SpO2 100%   BMI 24.94 kg/m²   Estimated body mass index is " "24.94 kg/m² as calculated from the following:    Height as of this encounter: 172.7 cm (67.99\").    Weight as of this encounter: 74.4 kg (164 lb).    Physical Exam  Vitals reviewed.   Constitutional:       Appearance: Normal appearance.   Cardiovascular:      Rate and Rhythm: Normal rate and regular rhythm.      Heart sounds: Normal heart sounds.   Pulmonary:      Effort: Pulmonary effort is normal.      Breath sounds: Normal breath sounds.   Skin:     General: Skin is warm and dry.      Capillary Refill: Capillary refill takes less than 2 seconds.   Neurological:      General: No focal deficit present.      Mental Status: She is alert and oriented to person, place, and time.   Psychiatric:         Mood and Affect: Mood normal.         Behavior: Behavior normal.        Physical Exam       Results  Imaging  MRI of the hip showed no sign of intra-articular pathology, but a slight signal change at the greater trochanter.       Assessment and Plan     Diagnoses and all orders for this visit:    1. Chronic left hip pain (Primary)  -     Ambulatory Referral to Orthopedic Surgery      Assessment & Plan  1. Left hip pain.  The patient reports recurring left hip pain, initially evaluated in 2019 with an MRI showing no intra-articular pathology but a slight signal change at the greater trochanter. She previously had a hip injection under fluoroscopy, which provided long-lasting relief. Recently, she has experienced heaviness and popping in the hip, similar to previous symptoms. A referral to an orthopedic specialist has been placed for further evaluation. If the orthopedic specialist requires imaging before the appointment, an x-ray or MRI will be ordered accordingly.    2. Ulcerative colitis.  The patient has a history of ulcerative colitis and has been on varying doses of steroids.         Follow Up  Return if symptoms worsen or fail to improve.      Patient or patient representative verbalized consent for the use of " Ambient Listening during the visit with  DAYSI Izaguirre for chart documentation. 10/23/2024  18:19 EDT    DAYSI Izaguirre

## 2024-10-29 ENCOUNTER — TELEPHONE (OUTPATIENT)
Dept: FAMILY MEDICINE CLINIC | Facility: CLINIC | Age: 49
End: 2024-10-29

## 2024-10-29 NOTE — TELEPHONE ENCOUNTER
"Caller: Jaida Cruz \"Amy\"    Relationship: Self    Best call back number: [phone]    What is the best time to reach you: ANYTIME    Who are you requesting to speak with (clinical staff, provider,  specific staff member):   REFERRAL COORDINATOR    What was the call regarding: PATIENT IS FOLLOWING UP ON REFERRAL     Is it okay if the provider responds through MyChart: NO    "

## 2024-11-06 ENCOUNTER — OFFICE VISIT (OUTPATIENT)
Dept: ORTHOPEDIC SURGERY | Facility: CLINIC | Age: 49
End: 2024-11-06
Payer: COMMERCIAL

## 2024-11-06 VITALS
WEIGHT: 164 LBS | BODY MASS INDEX: 24.86 KG/M2 | HEIGHT: 68 IN | SYSTOLIC BLOOD PRESSURE: 120 MMHG | DIASTOLIC BLOOD PRESSURE: 74 MMHG

## 2024-11-06 DIAGNOSIS — R10.32 LEFT GROIN PAIN: ICD-10-CM

## 2024-11-06 DIAGNOSIS — M25.552 LEFT HIP PAIN: Primary | ICD-10-CM

## 2024-11-06 NOTE — PROGRESS NOTES
[facility] [place] [facility] Office Visit - Angelina Rosario PA-C    Office Visit       Patient Name: Jaida Cruz    Chief Complaint:   Chief Complaint   Patient presents with    Left Hip - Pain, Initial Evaluation       Referring Physician: Lisseth Pal APRN    History of Present Illness:   Jaida Cruz is a 48 y.o. female who presents to discuss her left hip plain.  She reports groin pain over the past several months.  Is gotten progressively worse.  She has pain with weightbearing and walking difficulty sleeping.  It is groin pain with radiating pain down the thigh nothing past the knee.  She had the same problem back in 2019 and saw Ortho in [facility] with MRI.  She then had intra-articular injection which gave her relief for 2 years.  She complains of feeling of heaviness in the leg.  She is unable to take anti-inflammatories.  She reports occasional mechanical symptoms.  She has a history of ulcerative colitis and has been on steroids long-term in the past..  Here for further evaluation treatment conditions.      Subjective     Review of Systems   Constitutional:  Negative for chills, fever, unexpected weight gain and unexpected weight loss.   HENT:  Negative for congestion, postnasal drip and rhinorrhea.    Eyes:  Negative for blurred vision.   Respiratory:  Negative for shortness of breath.    Cardiovascular:  Negative for leg swelling.   Gastrointestinal:  Negative for abdominal pain, nausea and vomiting.   Genitourinary:  Negative for difficulty urinating.   Musculoskeletal:  Positive for arthralgias. Negative for gait problem, joint swelling and myalgias.   Skin:  Negative for skin lesions and wound.   Neurological:  Negative for dizziness, weakness, light-headedness and numbness.   Hematological:  Does not bruise/bleed easily.   Psychiatric/Behavioral:  Negative for depressed mood.         Past Medical History:   Past  Medical History:   Diagnosis Date    Anxiety     Hyperlipidemia     Inflammatory bowel disease     Osteopenia     Renal insufficiency        Past Surgical History:   Past Surgical History:   Procedure Laterality Date    ADENOIDECTOMY       SECTION      COLONOSCOPY      TUBAL ABDOMINAL LIGATION         Family History:   Family History   Problem Relation Age of Onset    Anxiety disorder Mother     Arthritis Mother     Hearing loss Mother     Asthma Father     Cancer Father         Bladder    COPD Father     Diabetes Father     Heart disease Father     Hyperlipidemia Father        Social History:   Social History     Socioeconomic History    Marital status:    Tobacco Use    Smoking status: Former     Current packs/day: 0.00     Average packs/day: 1 pack/day for 13.0 years (13.0 ttl pk-yrs)     Types: Cigarettes     Start date: 1999     Quit date:      Years since quittin.8    Smokeless tobacco: Never   Vaping Use    Vaping status: Never Used   Substance and Sexual Activity    Alcohol use: Yes     Alcohol/week: 3.0 standard drinks of alcohol     Types: 2 Glasses of wine, 1 Cans of beer per week    Drug use: Never    Sexual activity: Yes     Partners: Male     Birth control/protection: Tubal ligation       Medications:   Current Outpatient Medications:     albuterol sulfate  (90 Base) MCG/ACT inhaler, Inhale 2 puffs Every 4 (Four) Hours As Needed for Shortness of Air., Disp: 18 g, Rfl: 1    ALPRAZolam (XANAX) 1 MG tablet, Take 1 tablet 3 times a day by oral route for 30 days., Disp: , Rfl:     cetirizine (zyrTEC) 10 MG tablet, Take 1 tablet by mouth every night at bedtime., Disp: , Rfl:     Cholecalciferol (Vitamin D3) 50 MCG (2000 UT) capsule, Take 1 capsule by mouth Daily., Disp: , Rfl:     Entyvio 300 MG injection, 1 infusion every 8 weeks, Disp: , Rfl:     lisinopril (PRINIVIL,ZESTRIL) 5 MG tablet, Take 1 tablet by mouth Daily., Disp: 90 tablet, Rfl: 1    mesalamine (APRISO) 0.375  "g 24 hr capsule, , Disp: , Rfl:     montelukast (Singulair) 10 MG tablet, Take 1 tablet by mouth Every Night., Disp: 30 tablet, Rfl: 2    sodium chloride 0.9 % solution, , Disp: , Rfl:     Upadacitinib ER (Rinvoq) 30 MG tablet sustained-release 24 hour, Take 1 tablet by mouth Daily., Disp: , Rfl:     Allergies:   Allergies   Allergen Reactions    Minocycline Angioedema    Nsaids Other (See Comments)     Reaction: Patient has CKD and should not receive    Tetracycline Hives and Rash       I have reviewed and updated the following portions of the patient's history and review of systems: allergies, current medications, past family history, past medical history, past social history, past surgical history and problem list.    Objective      Vital Signs:   Vitals:    11/06/24 1512   BP: 120/74   Weight: 74.4 kg (164 lb)   Height: 172.7 cm (67.99\")       Ortho Exam:  Left hip exam:    RANGE OF MOTION:   FLEXION CONTRACTURE: None   FLEXION: 110 degrees   INTERNAL ROTATION: 20 degrees at 90 degrees of flexion   EXTERNAL ROTATION: 40 degrees at 90 degrees of flexion    PAIN WITH HIP MOTION: Positive   PAIN WITH LOGROLL: Yes  STINCHFIELD TEST: Positive   STRENGTH:  5/5 hip adduction, abduction, flexion. 5/5 strength knee flexion, extension. 5/5 strength ankle dorsiflexion and plantarflexion.     GREATER TROCHANTER BURSAL PAIN:  No    SENSATION TO LIGHT TOUCH:  DEEP PERONEAL/SUPERFICIAL PERONEAL/SURAL/SAPHENOUS/TIBIAL:  intact    STRAIGHT LEG TEST:   negative        Results Review:         Assessment / Plan      Assessment:  Diagnoses and all orders for this visit:    1. Left hip pain (Primary)  -     XR Hip With or Without Pelvis 2 - 3 View Left        Quality Metrics:   BMI:   BMI is within normal parameters. No other follow-up for BMI required.       Tobacco:   Jaida Anthony  reports that she quit smoking about 12 years ago. Her smoking use included cigarettes. She started smoking about 25 years ago. She has a 13 " pack-year smoking history. She has never used smokeless tobacco.         Plan:  Left hip and groin pain.  I reviewed today's x-rays clinical findings past and current treatment with the patient.  On exam, she has pain with hip motion, positive Stinchfield and logroll.  I do think her pain is coming from her hip joint.  I see no evidence of her pain coming from her lumbar spine.  She has had relief in the past with intra-articular hip injection in 2019.  X-rays show no evidence of significant degenerative changes.  Recommendation today is MRI of the left hip for further evaluation for evaluation of labral as well as further evaluation of the bone given her long-term steroid use.  I will see her back following the MRI, sooner if needed.          Angelina Rosario PA-C  [facility] [place] [facility]       Dictated using Dragon Speech Recognition.

## 2024-11-27 DIAGNOSIS — R10.32 LEFT GROIN PAIN: ICD-10-CM

## 2024-11-27 DIAGNOSIS — M25.552 LEFT HIP PAIN: Primary | ICD-10-CM

## 2025-01-04 ENCOUNTER — HOSPITAL ENCOUNTER (OUTPATIENT)
Dept: MRI IMAGING | Facility: HOSPITAL | Age: 50
Discharge: HOME | End: 2025-01-04
Payer: COMMERCIAL

## 2025-01-04 DIAGNOSIS — R10.32 LEFT GROIN PAIN: ICD-10-CM

## 2025-01-04 DIAGNOSIS — M25.552 LEFT HIP PAIN: ICD-10-CM

## 2025-01-04 PROCEDURE — 73721 MRI JNT OF LWR EXTRE W/O DYE: CPT

## 2025-01-08 ENCOUNTER — OFFICE VISIT (OUTPATIENT)
Age: 50
End: 2025-01-08
Payer: COMMERCIAL

## 2025-01-08 VITALS
BODY MASS INDEX: 24.67 KG/M2 | SYSTOLIC BLOOD PRESSURE: 150 MMHG | WEIGHT: 162.8 LBS | HEIGHT: 68 IN | DIASTOLIC BLOOD PRESSURE: 108 MMHG

## 2025-01-08 DIAGNOSIS — M25.552 LEFT HIP PAIN: Primary | ICD-10-CM

## 2025-01-08 DIAGNOSIS — R10.32 LEFT GROIN PAIN: ICD-10-CM

## 2025-01-08 NOTE — PROGRESS NOTES
[facility] [place] [facility] Office Follow Up       Office Follow Up Visit       Patient Name: Jaida Cruz    Chief Complaint:   Chief Complaint   Patient presents with    Follow-up     2 month follow up- left hip pain (MRI 1/4/24)       Referring Physician: No ref. provider found    History of Present Illness:   Jaida Cruz returns to clinic today for f/up left hip MRI.  She reports no change in her pain.  She continues to have groin pain with certain activity.  No new symptoms.      Subjective     Review of Systems   All other systems reviewed and are negative.       I have reviewed and updated the following portions of the patient's history and review of systems: allergies, current medications, past family history, past medical history, past social history, past surgical history and problem list.    Medications:   Current Outpatient Medications:     albuterol sulfate  (90 Base) MCG/ACT inhaler, Inhale 2 puffs Every 4 (Four) Hours As Needed for Shortness of Air., Disp: 18 g, Rfl: 1    ALPRAZolam (XANAX) 1 MG tablet, Take 1 tablet 3 times a day by oral route for 30 days., Disp: , Rfl:     cetirizine (zyrTEC) 10 MG tablet, Take 1 tablet by mouth every night at bedtime., Disp: , Rfl:     Cholecalciferol (Vitamin D3) 50 MCG (2000 UT) capsule, Take 1 capsule by mouth Daily., Disp: , Rfl:     Entyvio 300 MG injection, 1 infusion every 8 weeks, Disp: , Rfl:     lisinopril (PRINIVIL,ZESTRIL) 5 MG tablet, Take 1 tablet by mouth Daily., Disp: 90 tablet, Rfl: 1    mesalamine (APRISO) 0.375 g 24 hr capsule, , Disp: , Rfl:     montelukast (Singulair) 10 MG tablet, Take 1 tablet by mouth Every Night., Disp: 30 tablet, Rfl: 2    sodium chloride 0.9 % solution, , Disp: , Rfl:     Upadacitinib ER (Rinvoq) 30 MG tablet sustained-release 24 hour, Take 1 tablet by mouth Daily., Disp: , Rfl:     Allergies:   Allergies   Allergen Reactions    Minocycline  "Angioedema    Nsaids Other (See Comments)     Reaction: Patient has CKD and should not receive    Tetracycline Hives and Rash         Objective      Vital Signs:   Vitals:    01/08/25 1458   BP: (!) 150/108   Weight: 73.8 kg (162 lb 12.8 oz)   Height: 172.7 cm (67.99\")       Ortho Exam:  Left hip exam: Mild groin pain with hip motion.  5/5 motor strength.  NVI distally.    Results Review:  MRI Hip Left Without Contrast  Narrative: MRI HIP LEFT WO CONTRAST    Date of Exam: 1/4/2025 8:25 AM EST    Indication: Left hip and groin pain..     Comparison: Left hip x-ray 11/6/2024    Technique:  Routine multiplanar/multisequence images of the left hip were obtained without contrast administration.      Findings:  OSSEOUS STRUCTURES  No fracture, stress reaction, avascular necrosis, or focal osseous lesion is seen. Bone marrow signal intensity is normal.    ARTICULAR CARTILAGE AND LABRUM  Articular cartilage: There is minimal joint space narrowing and cartilage disease.  Labrum: Degenerative appearance of the superior labrum. No paralabral cyst. The ligamentum teres is normal.  The alpha angle is  within normal limits (<55-60%).    JOINT OR BURSAL EFFUSION  Joint effusion: There is no significant joint effusion.  Bursal effusion: No iliopsoas or trochanteric bursal effusion is present.    MUSCLES AND TENDONS  The rectus femoris, iliopsoas, proximal hamstrings, quadratus femoris, and hip abductors are intact.    OTHER FINDINGS  Miscellaneous: Moderate facet degenerative change lower lumbar spine. Diverticulosis sigmoid colon.  Impression: 1.Minimal arthritis of the left hip. Degenerative appearance of the superior labrum.  2.Moderate facet degenerative change lower lumbar spine.    Electronically Signed: Heike Munson MD    1/6/2025 1:24 PM EST    Workstation ID: [id number]       MRI Hip Left Without Contrast    Result Date: 1/6/2025  1.Minimal arthritis of the left hip. Degenerative appearance of the superior labrum. 2.Moderate " facet degenerative change lower lumbar spine. Electronically Signed: Heike Munson MD  1/6/2025 1:24 PM EST  Workstation ID: [id number]        Assessment / Plan      Assessment:   Diagnoses and all orders for this visit:    1. Left hip pain (Primary)    2. Left groin pain        Quality Metrics:   BMI:   BMI is within normal parameters. No other follow-up for BMI required.       Tobacco:   Jaida Cruz  reports that she quit smoking about 13 years ago. Her smoking use included cigarettes. She started smoking about 26 years ago. She has a 13 pack-year smoking history. She has never used smokeless tobacco.      Plan:  Left hip and groin pain.  I reviewed MRI from 1/4/25, clinical findings with the patient.  MRI shows mild degenerative changes in the joint and mild degenerative labral pathology.  There is nothing indicative of surgery.  Patient has failed PT and is unable to take nsaids.  Plan today is she return for intra articular cortisone injection with Dr. Turpin.  She had one several years ago with relief for about 2 years.  If pain persists following injection, we may consider referral to a surgeon that performs hip arthroscopy.  I will see her back for f/up after the injection, sooner if needed.    History, diagnosis and treatment plan discussed with Dr. Turpin.         Angelina Rosario PA-C  [facility] [place] [facility]    Dictated using Dragon Speech Recognition.

## 2025-01-09 ENCOUNTER — TELEPHONE (OUTPATIENT)
Dept: FAMILY MEDICINE CLINIC | Facility: CLINIC | Age: 50
End: 2025-01-09
Payer: COMMERCIAL

## 2025-01-22 ENCOUNTER — OFFICE VISIT (OUTPATIENT)
Age: 50
End: 2025-01-22
Payer: COMMERCIAL

## 2025-01-22 DIAGNOSIS — M25.552 LEFT HIP PAIN: Primary | ICD-10-CM

## 2025-01-22 DIAGNOSIS — M16.12 PRIMARY OSTEOARTHRITIS OF LEFT HIP: ICD-10-CM

## 2025-01-22 RX ORDER — LIDOCAINE HYDROCHLORIDE 10 MG/ML
2 INJECTION, SOLUTION EPIDURAL; INFILTRATION; INTRACAUDAL; PERINEURAL
Status: COMPLETED | OUTPATIENT
Start: 2025-01-22 | End: 2025-01-22

## 2025-01-22 RX ORDER — BUPIVACAINE HYDROCHLORIDE 2.5 MG/ML
2 INJECTION, SOLUTION EPIDURAL; INFILTRATION; INTRACAUDAL
Status: COMPLETED | OUTPATIENT
Start: 2025-01-22 | End: 2025-01-22

## 2025-01-22 RX ORDER — TRIAMCINOLONE ACETONIDE 40 MG/ML
80 INJECTION, SUSPENSION INTRA-ARTICULAR; INTRAMUSCULAR
Status: COMPLETED | OUTPATIENT
Start: 2025-01-22 | End: 2025-01-22

## 2025-01-22 RX ADMIN — LIDOCAINE HYDROCHLORIDE 2 ML: 10 INJECTION, SOLUTION EPIDURAL; INFILTRATION; INTRACAUDAL; PERINEURAL at 10:24

## 2025-01-22 RX ADMIN — TRIAMCINOLONE ACETONIDE 80 MG: 40 INJECTION, SUSPENSION INTRA-ARTICULAR; INTRAMUSCULAR at 10:24

## 2025-01-22 RX ADMIN — BUPIVACAINE HYDROCHLORIDE 2 ML: 2.5 INJECTION, SOLUTION EPIDURAL; INFILTRATION; INTRACAUDAL at 10:24

## 2025-01-22 NOTE — PROGRESS NOTES
Procedure   - Large Joint Arthrocentesis: L hip joint on 1/22/2025 10:24 AM  Indications: pain  Details: 21 G needle, anterior approach  Medications: 2 mL bupivacaine (PF) 0.25 %; 2 mL lidocaine PF 1% 1 %; 80 mg triamcinolone acetonide 40 MG/ML  Outcome: tolerated well, no immediate complications  Procedure, treatment alternatives, risks and benefits explained, specific risks discussed. Consent was given by the patient. Immediately prior to procedure a time out was called to verify the correct patient, procedure, equipment, support staff and site/side marked as required. Patient was prepped and draped in the usual sterile fashion.        49-year-old female presents with left hip pain from left hip osteoarthritis.  Patient is a referral from Angelina TURCIOS for ultrasound-guided left hip joint corticosteroid injection.  Previous office documentation and images were personally reviewed prior to the visit.  We discussed them in detail how they correlate to experienced symptoms.  I explained the procedure in detail.  I answered all questions to the best my ability.  Risks and benefits as well as post procedure instructions were provided.  Patient elected to proceed and tolerated this procedure well.  See procedure note.  Follow-up with me will be on an as-needed basis.

## 2025-04-18 DIAGNOSIS — Q61.02 MULTIPLE CONGENITAL CYSTS OF KIDNEY: ICD-10-CM

## 2025-04-21 RX ORDER — LISINOPRIL 5 MG/1
5 TABLET ORAL DAILY
Qty: 30 TABLET | Refills: 0 | Status: SHIPPED | OUTPATIENT
Start: 2025-04-21

## 2025-04-21 NOTE — TELEPHONE ENCOUNTER
Hub relay: left message patient's medication refilled and they need to schedule a medication recheck with Lisseth

## 2025-06-10 DIAGNOSIS — Q61.02 MULTIPLE CONGENITAL CYSTS OF KIDNEY: ICD-10-CM

## 2025-06-10 RX ORDER — LISINOPRIL 5 MG/1
5 TABLET ORAL DAILY
Qty: 90 TABLET | Refills: 0 | Status: SHIPPED | OUTPATIENT
Start: 2025-06-10

## 2025-06-10 NOTE — TELEPHONE ENCOUNTER
Here's the issue - patient was seen in August 2024 for annual physical, was told 6-months to follow-up. She didn't schedule an appt, so when a refill request was sent in, 30-day supply was sent and she was told to schedule an appt - still didn't. At this point, she's almost back around to August again, time for another annual physical. Regardless of reason for taking Lisinopril, we need to see every 6-months so we can check BP and labs for monitoring purposes. Will send 90-day supply now to allow time for her to schedule an annual physical in August 2025 (after 8/14/25)

## 2025-07-25 DIAGNOSIS — M85.80 OSTEOPENIA, UNSPECIFIED LOCATION: Primary | ICD-10-CM
